# Patient Record
Sex: MALE | Race: ASIAN | NOT HISPANIC OR LATINO | Employment: FULL TIME | URBAN - METROPOLITAN AREA
[De-identification: names, ages, dates, MRNs, and addresses within clinical notes are randomized per-mention and may not be internally consistent; named-entity substitution may affect disease eponyms.]

---

## 2022-09-15 ENCOUNTER — OFFICE VISIT (OUTPATIENT)
Dept: FAMILY MEDICINE | Facility: CLINIC | Age: 37
End: 2022-09-15
Payer: COMMERCIAL

## 2022-09-15 VITALS
RESPIRATION RATE: 16 BRPM | DIASTOLIC BLOOD PRESSURE: 76 MMHG | WEIGHT: 160.8 LBS | HEART RATE: 77 BPM | SYSTOLIC BLOOD PRESSURE: 127 MMHG | TEMPERATURE: 98.1 F | OXYGEN SATURATION: 98 %

## 2022-09-15 DIAGNOSIS — S39.012A STRAIN OF LUMBAR REGION, INITIAL ENCOUNTER: Primary | ICD-10-CM

## 2022-09-15 PROCEDURE — 99203 OFFICE O/P NEW LOW 30 MIN: CPT | Performed by: FAMILY MEDICINE

## 2022-09-15 RX ORDER — NAPROXEN 500 MG/1
500 TABLET ORAL 2 TIMES DAILY WITH MEALS
Qty: 60 TABLET | Refills: 4 | Status: SHIPPED | OUTPATIENT
Start: 2022-09-15

## 2022-09-16 NOTE — PATIENT INSTRUCTIONS
Ice your back several times a day.    Naproxen up to two times daily as needed for pain.  May also use tylenol if needed.    Back Exercises  Back exercises help treat and prevent back injuries. The goal is to increase your strength in your belly (abdominal) and back muscles. These exercises can also help with flexibility. Start these exercises when told by your doctor.  HOME CARE  Back exercises include:  Pelvic Tilt.  Lie on your back with your knees bent. Tilt your pelvis until the lower part of your back is against the floor. Hold this position 5 to 10 sec. Repeat this exercise 5 to 10 times.  Knee to Chest.  Pull 1 knee up against your chest and hold for 20 to 30 seconds. Repeat this with the other knee. This may be done with the other leg straight or bent, whichever feels better. Then, pull both knees up against your chest.  Sit-Ups or Curl-Ups.  Bend your knees 90 degrees. Start with tilting your pelvis, and do a partial, slow sit-up. Only lift your upper half 30 to 45 degrees off the floor. Take at least 2 to 3 seonds for each sit-up. Do not do sit-ups with your knees out straight. If partial sit-ups are difficult, simply do the above but with only tightening your belly (abdominal) muscles and holding it as told.  Hip-Lift.  Lie on your back with your knees flexed 90 degrees. Push down with your feet and shoulders as you raise your hips 2 inches off the floor. Hold for 10 seconds, repeat 5 to 10 times.  Back Arches.  Lie on your stomach. Prop yourself up on bent elbows. Slowly press on your hands, causing an arch in your low back. Repeat 3 to 5 times.  Shoulder-Lifts.  Lie face down with arms beside your body. Keep hips and belly pressed to floor as you slowly lift your head and shoulders off the floor.  Do not overdo your exercises. Be careful in the beginning. Exercises may cause you some mild back discomfort. If the pain lasts for more than 15 minutes, stop the exercises until you see your doctor.  Improvement with exercise for back problems is slow.   Document Released: 01/20/2012 Document Revised: 03/11/2013 Document Reviewed: 10/18/2012  [facility]  Patient Information  2013 [employer] (Ins[employer]), [employer]         [employer]

## 2022-09-16 NOTE — PROGRESS NOTES
OUTPATIENT VISIT NOTE                                                   Date of Visit: 9/15/2022     Chief Complaint   Patient presents with:  Back Problem: Low back pain for 3 days, had this pain before but this time pain doesn't go away and sharp on the right side            History of Present Illness   Joann Burgess is a 37 year old male c/o right side low back pain for three days.  Does lifting at work.  No numbness or tingling of legs.  No weakness of legs.  No fevers.  Used some tylenol two months ago.       MEDICATIONS   Current Outpatient Medications   Medication     naproxen (NAPROSYN) 500 MG tablet     No current facility-administered medications for this visit.         SOCIAL HISTORY   Social History     Tobacco Use     Smoking status: Not on file     Smokeless tobacco: Not on file   Substance Use Topics     Alcohol use: Not on file           Physical Exam   Vitals:    09/15/22 1900   BP: 127/76   Pulse: 77   Resp: 16   Temp: 98.1  F (36.7  C)   TempSrc: Oral   SpO2: 98%   Weight: 72.9 kg (160 lb 12.8 oz)        GEN:  NAD  LUNGS:  Clear to auscultation without wheezing.  Normal effort.  HEART:  RRR without murmur, rub or gallop   BACK:  No pain to percussion over LS spine.  No tenderness to palpation over muscles.  NEURO:  DTR's: Patellar  L 2/4  R 2/4                                Achilles  L  2/4  R 2/4                  Motor:  Great Toe Flexion L 5/5  R 5/5                                                  Extension L 5/5  R 5/5                              Ankle Flexion L 5/5  R 5/5                                        Extension L 5/5  R 5/5                              Knee Flexion  L 5/5  R 5/5                                        Extension  L 5/5  R 5/5                              Hip Abduction  L 5/5  R 5/5                                    Adduction   L 5/5  R 5/5                              Hip Flexion L 5/5  R 5/5                                     Extension L 5/5  R 5/5                   Sensation intact to light touch throughout both LE                   Straight leg raising negative BL          Assessment and Plan     Strain of lumbar region, initial encounter  Likely SI joint  Ice several times a day.  Naproxen up to two times a day as needed.  Given back exercises to start.  Referral given to PT if not improving.  - naproxen (NAPROSYN) 500 MG tablet  Dispense: 60 tablet; Refill: 4  - Physical Therapy Referral                   Discussed signs / symptoms that warrant urgent / emergent medical attention.     Recheck if worsening or not improving.       Tova Chisholm MD          Pertinent History     The following portions of the patient's history were reviewed and updated as appropriate: allergies, current medications, past family history, past medical history, past social history, past surgical history and problem list.

## 2023-05-02 ENCOUNTER — OFFICE VISIT (OUTPATIENT)
Dept: FAMILY MEDICINE | Facility: CLINIC | Age: 38
End: 2023-05-02
Payer: COMMERCIAL

## 2023-05-02 VITALS
RESPIRATION RATE: 18 BRPM | BODY MASS INDEX: 25.01 KG/M2 | HEART RATE: 98 BPM | OXYGEN SATURATION: 96 % | TEMPERATURE: 97.3 F | HEIGHT: 68 IN | DIASTOLIC BLOOD PRESSURE: 72 MMHG | WEIGHT: 165 LBS | SYSTOLIC BLOOD PRESSURE: 106 MMHG

## 2023-05-02 DIAGNOSIS — Z11.59 NEED FOR HEPATITIS C SCREENING TEST: ICD-10-CM

## 2023-05-02 DIAGNOSIS — Z13.21 SCREENING FOR ENDOCRINE, NUTRITIONAL, METABOLIC AND IMMUNITY DISORDER: ICD-10-CM

## 2023-05-02 DIAGNOSIS — Z13.29 SCREENING FOR ENDOCRINE, NUTRITIONAL, METABOLIC AND IMMUNITY DISORDER: ICD-10-CM

## 2023-05-02 DIAGNOSIS — Z13.0 SCREENING FOR ENDOCRINE, NUTRITIONAL, METABOLIC AND IMMUNITY DISORDER: ICD-10-CM

## 2023-05-02 DIAGNOSIS — Z00.00 ROUTINE GENERAL MEDICAL EXAMINATION AT A HEALTH CARE FACILITY: Primary | ICD-10-CM

## 2023-05-02 DIAGNOSIS — Z11.59 SCREENING FOR VIRAL DISEASE: ICD-10-CM

## 2023-05-02 DIAGNOSIS — Z13.228 SCREENING FOR ENDOCRINE, NUTRITIONAL, METABOLIC AND IMMUNITY DISORDER: ICD-10-CM

## 2023-05-02 DIAGNOSIS — L98.9 LESION OF SKIN OF NOSE: ICD-10-CM

## 2023-05-02 DIAGNOSIS — Z11.4 SCREENING FOR HIV (HUMAN IMMUNODEFICIENCY VIRUS): ICD-10-CM

## 2023-05-02 DIAGNOSIS — R06.83 SNORING: ICD-10-CM

## 2023-05-02 LAB
ALBUMIN SERPL BCG-MCNC: 4.8 G/DL (ref 3.5–5.2)
ALP SERPL-CCNC: 105 U/L (ref 40–129)
ALT SERPL W P-5'-P-CCNC: 25 U/L (ref 10–50)
ANION GAP SERPL CALCULATED.3IONS-SCNC: 11 MMOL/L (ref 7–15)
AST SERPL W P-5'-P-CCNC: 32 U/L (ref 10–50)
BILIRUB SERPL-MCNC: 0.7 MG/DL
BUN SERPL-MCNC: 11.1 MG/DL (ref 6–20)
CALCIUM SERPL-MCNC: 9.9 MG/DL (ref 8.6–10)
CHLORIDE SERPL-SCNC: 103 MMOL/L (ref 98–107)
CHOLEST SERPL-MCNC: 182 MG/DL
CREAT SERPL-MCNC: 0.86 MG/DL (ref 0.67–1.17)
DEPRECATED HCO3 PLAS-SCNC: 27 MMOL/L (ref 22–29)
GFR SERPL CREATININE-BSD FRML MDRD: >90 ML/MIN/1.73M2
GLUCOSE SERPL-MCNC: 123 MG/DL (ref 70–99)
HBA1C MFR BLD: 6.4 % (ref 0–5.6)
HDLC SERPL-MCNC: 38 MG/DL
LDLC SERPL CALC-MCNC: 82 MG/DL
NONHDLC SERPL-MCNC: 144 MG/DL
POTASSIUM SERPL-SCNC: 4 MMOL/L (ref 3.4–5.3)
PROT SERPL-MCNC: 7.7 G/DL (ref 6.4–8.3)
SODIUM SERPL-SCNC: 141 MMOL/L (ref 136–145)
TRIGL SERPL-MCNC: 312 MG/DL
TSH SERPL DL<=0.005 MIU/L-ACNC: 2.65 UIU/ML (ref 0.3–4.2)

## 2023-05-02 PROCEDURE — 86706 HEP B SURFACE ANTIBODY: CPT | Performed by: FAMILY MEDICINE

## 2023-05-02 PROCEDURE — 84443 ASSAY THYROID STIM HORMONE: CPT | Performed by: FAMILY MEDICINE

## 2023-05-02 PROCEDURE — 80053 COMPREHEN METABOLIC PANEL: CPT | Performed by: FAMILY MEDICINE

## 2023-05-02 PROCEDURE — 91312 COVID-19 BIVALENT 12+ (PFIZER): CPT | Performed by: FAMILY MEDICINE

## 2023-05-02 PROCEDURE — 87389 HIV-1 AG W/HIV-1&-2 AB AG IA: CPT | Performed by: FAMILY MEDICINE

## 2023-05-02 PROCEDURE — 87340 HEPATITIS B SURFACE AG IA: CPT | Performed by: FAMILY MEDICINE

## 2023-05-02 PROCEDURE — 86803 HEPATITIS C AB TEST: CPT | Performed by: FAMILY MEDICINE

## 2023-05-02 PROCEDURE — 36415 COLL VENOUS BLD VENIPUNCTURE: CPT | Performed by: FAMILY MEDICINE

## 2023-05-02 PROCEDURE — 0124A COVID-19 BIVALENT 12+ (PFIZER): CPT | Performed by: FAMILY MEDICINE

## 2023-05-02 PROCEDURE — 80061 LIPID PANEL: CPT | Performed by: FAMILY MEDICINE

## 2023-05-02 PROCEDURE — 90471 IMMUNIZATION ADMIN: CPT | Performed by: FAMILY MEDICINE

## 2023-05-02 PROCEDURE — 83036 HEMOGLOBIN GLYCOSYLATED A1C: CPT | Performed by: FAMILY MEDICINE

## 2023-05-02 PROCEDURE — 99395 PREV VISIT EST AGE 18-39: CPT | Mod: 25 | Performed by: FAMILY MEDICINE

## 2023-05-02 PROCEDURE — 99213 OFFICE O/P EST LOW 20 MIN: CPT | Mod: 25 | Performed by: FAMILY MEDICINE

## 2023-05-02 PROCEDURE — 90715 TDAP VACCINE 7 YRS/> IM: CPT | Performed by: FAMILY MEDICINE

## 2023-05-02 ASSESSMENT — ENCOUNTER SYMPTOMS
DYSURIA: 0
HEARTBURN: 0
DIARRHEA: 0
WEAKNESS: 0
CONSTIPATION: 0
ABDOMINAL PAIN: 0
EYE PAIN: 0
HEMATURIA: 0
NERVOUS/ANXIOUS: 0
JOINT SWELLING: 0
NAUSEA: 0
PARESTHESIAS: 0
DIZZINESS: 0
COUGH: 1
ARTHRALGIAS: 0
MYALGIAS: 0
PALPITATIONS: 0
FREQUENCY: 0
SORE THROAT: 0
CHILLS: 0
HEADACHES: 0
HEMATOCHEZIA: 0
SHORTNESS OF BREATH: 0
FEVER: 0

## 2023-05-02 ASSESSMENT — PATIENT HEALTH QUESTIONNAIRE - PHQ9
SUM OF ALL RESPONSES TO PHQ QUESTIONS 1-9: 6
SUM OF ALL RESPONSES TO PHQ QUESTIONS 1-9: 6

## 2023-05-02 NOTE — PROGRESS NOTES
SUBJECTIVE:   CC: Joann is an 37 year old who presents for preventative health visit.       5/2/2023     4:50 PM   Additional Questions   Roomed by    Accompanied by self     Patient has been advised of split billing requirements and indicates understanding: Yes  Healthy Habits:     Getting at least 3 servings of Calcium per day:  NO    Bi-annual eye exam:  NO    Dental care twice a year:  NO    Sleep apnea or symptoms of sleep apnea:  Excessive snoring and Sleep apnea    Diet:  Regular (no restrictions) and Vegetarian/vegan    Frequency of exercise:  1 day/week    Duration of exercise:  Less than 15 minutes    Taking medications regularly:  Yes    Medication side effects:  Not applicable    PHQ-2 Total Score: 3    Additional concerns today:  Yes    PROBLEMS TO ADD ON...  New patient, he is here for a physical, he is accompanied by his wife, he has a skin lesionat the root of the nose has been there for years, seems to be getting bigger.  Patient also snoring while lying down and when sleeping, does not stop breathing.  Only taking allergy medication available OTC he works for [employer], he is a non-smoker drinks alcohol occasionally, family history is positive for asthma and snoring, dad.  Today's PHQ-2 Score:       5/2/2023     4:38 PM   PHQ-2 ( 1999 [employer])   Q1: Little interest or pleasure in doing things 3   Q2: Feeling down, depressed or hopeless 0   PHQ-2 Score 3   Q1: Little interest or pleasure in doing things Nearly every day   Q2: Feeling down, depressed or hopeless Not at all   PHQ-2 Score 3       Have you ever done Advance Care Planning? (For example, a Health Directive, POLST, or a discussion with a medical provider or your loved ones about your wishes): No, advance care planning information given to patient to review.  Patient declined advance care planning discussion at this time.    Social History     Tobacco Use     Smoking status: Never     Smokeless tobacco: Never   Vaping Use     Vaping status:  Not on file   Substance Use Topics     Alcohol use: Not on file             5/2/2023     4:37 PM   Alcohol Use   Prescreen: >3 drinks/day or >7 drinks/week? No       Last PSA: No results found for: PSA    Reviewed orders with patient. Reviewed health maintenance and updated orders accordingly - Yes  Lab work is in process  Labs reviewed in EPIC  BP Readings from Last 3 Encounters:   05/02/23 106/72   09/15/22 127/76    Wt Readings from Last 3 Encounters:   05/02/23 74.8 kg (165 lb)   09/15/22 72.9 kg (160 lb 12.8 oz)                  There is no problem list on file for this patient.    No past surgical history on file.    Social History     Tobacco Use     Smoking status: Never     Smokeless tobacco: Never   Vaping Use     Vaping status: Not on file   Substance Use Topics     Alcohol use: Not on file     No family history on file.      Current Outpatient Medications   Medication Sig Dispense Refill     naproxen (NAPROSYN) 500 MG tablet Take 1 tablet (500 mg) by mouth 2 times daily (with meals) (Patient not taking: Reported on 5/2/2023) 60 tablet 4     No Known Allergies  Recent Labs   Lab Test 05/02/23  1738   A1C 6.4*   LDL 82   HDL 38*   TRIG 312*   ALT 25   CR 0.86   GFRESTIMATED >90   POTASSIUM 4.0   TSH 2.65        Reviewed and updated as needed this visit by clinical staff   Tobacco  Allergies  Meds              Reviewed and updated as needed this visit by Provider                   No past medical history on file.   No past surgical history on file.  OB History   No obstetric history on file.       Review of Systems   Constitutional: Negative for chills and fever.   HENT: Negative for congestion, ear pain, hearing loss and sore throat.    Eyes: Negative for pain and visual disturbance.   Respiratory: Positive for cough. Negative for shortness of breath.    Cardiovascular: Negative for chest pain, palpitations and peripheral edema.   Gastrointestinal: Negative for abdominal pain, constipation, diarrhea,  "heartburn, hematochezia and nausea.   Genitourinary: Negative for dysuria, frequency, genital sores, hematuria, impotence, penile discharge and urgency.   Musculoskeletal: Negative for arthralgias, joint swelling and myalgias.   Skin: Negative for rash.   Neurological: Negative for dizziness, weakness, headaches and paresthesias.   Psychiatric/Behavioral: Negative for mood changes. The patient is not nervous/anxious.          OBJECTIVE:   /72 (BP Location: Left arm, Patient Position: Sitting, Cuff Size: Adult Regular)   Pulse 98   Temp 97.3  F (36.3  C) (Temporal)   Resp 18   Ht 1.727 m (5' 8\")   Wt 74.8 kg (165 lb)   SpO2 96%   BMI 25.09 kg/m      Physical Exam  GENERAL: healthy, alert and no distress  EYES: Eyes grossly normal to inspection, PERRL and conjunctivae and sclerae normal  HENT: ear canals and TM's normal, nose and mouth without ulcers or lesions  NECK: no adenopathy, no asymmetry, masses, or scars and thyroid normal to palpation  RESP: lungs clear to auscultation - no rales, rhonchi or wheezes  CV: regular rate and rhythm, normal S1 S2, no S3 or S4, no murmur, click or rub, no peripheral edema and peripheral pulses strong  ABDOMEN: soft, nontender, no hepatosplenomegaly, no masses and bowel sounds normal  MS: no gross musculoskeletal defects noted, no edema  SKIN: About 2 to 3 mm in size cystic-like skin lesion and nasal root area.  Otherwise Remainder of skin exam is benign.  NEURO: Normal strength and tone, mentation intact and speech normal  PSYCH: mentation appears normal, affect normal/bright    Diagnostic Test Results:  Labs reviewed in Epic    ASSESSMENT/PLAN:   (Z00.00) Routine general medical examination at a health care facility  (primary encounter diagnosis)  Comment:   Plan:     (Z11.4) Screening for HIV (human immunodeficiency virus)  Comment:   Plan: HIV Antigen Antibody Combo            (Z11.59) Need for hepatitis C screening test  Comment:   Plan: Hepatitis C Screen Reflex " "to HCV RNA Quant and         Genotype            (Z13.29,  Z13.21,  Z13.228,  Z13.0) Screening for endocrine, nutritional, metabolic and immunity disorder  Comment:   Plan: Lipid panel reflex to direct LDL Non-fasting,         **Comprehensive metabolic panel FUTURE 2mo,         **TSH with free T4 reflex FUTURE 2mo,         **Hemoglobin A1c FUTURE 3mo            (Z11.59) Screening for viral disease  Comment:   Plan: Hepatitis B surface antigen, Hepatitis B         Surface Antibody            (R06.83) Snoring  Comment:   Plan: Adult ENT  Referral            (L98.9) Lesion of skin of nose  Comment:   Plan: Adult Dermatology Referral          New patient, here for a physical, also mentionned snoring while lying down and sleeping, differential diagnosis discussed, included enlarged adenoid or primary sleep disorder, will refer to ENT to check for adenoid as a possible cause, consider sleep study.  Skin lesion right nasal root, referred to dermatologist for further care.  General physical exam lab works discussed and ordered.  Check hepatitis B immunization status.    Patient has been advised of split billing requirements and indicates understanding: Yes      COUNSELING:   Reviewed preventive health counseling, as reflected in patient instructions       Regular exercise       Healthy diet/nutrition       Vision screening       Hearing screening      BMI:   Estimated body mass index is 25.09 kg/m  as calculated from the following:    Height as of this encounter: 1.727 m (5' 8\").    Weight as of this encounter: 74.8 kg (165 lb).   Weight management plan: Discussed healthy diet and exercise guidelines      He reports that he has never smoked. He has never used smokeless tobacco.            Chris Prabhakar MD  [facility]  Answers for HPI/ROS submitted by the patient on 5/2/2023  PHQ9 TOTAL SCORE: 6      "

## 2023-05-03 LAB
HBV SURFACE AB SERPL IA-ACNC: 580.3 M[IU]/ML
HBV SURFACE AB SERPL IA-ACNC: REACTIVE M[IU]/ML
HBV SURFACE AG SERPL QL IA: NONREACTIVE
HCV AB SERPL QL IA: NONREACTIVE
HIV 1+2 AB+HIV1 P24 AG SERPL QL IA: NONREACTIVE

## 2023-05-09 ENCOUNTER — TELEPHONE (OUTPATIENT)
Dept: FAMILY MEDICINE | Facility: CLINIC | Age: 38
End: 2023-05-09
Payer: COMMERCIAL

## 2023-05-09 NOTE — TELEPHONE ENCOUNTER
Called pt and left a message to call clinic back.     - if pt calls back, please relay Dr. Prabhakar's message.    Anthony Granados, KEKEN MARIALUISA  [facility]      ----- Message from Yanni Quick sent at 5/8/2023 10:52 AM CDT -----    ----- Message -----  From: Chris Prabhakar MD  Sent: 5/5/2023  10:03 AM CDT  To: Vanna Aguilar Care Team Pool    Blood sugar is running in the prediabetes range, he should work on healthy lifestyle changes, regular physical activities, recheck in about 4 months.  Cholesterol was also mildly elevated.

## 2023-05-10 NOTE — TELEPHONE ENCOUNTER
Contacted patient and relayed message below from Dr Prabhakar.  Appointment scheduled on 9/10/23 with Dr Prabhakar.  No further action needed.    Karina Potter RN  [facility]

## 2023-05-21 ENCOUNTER — HEALTH MAINTENANCE LETTER (OUTPATIENT)
Age: 38
End: 2023-05-21

## 2023-06-14 ENCOUNTER — OFFICE VISIT (OUTPATIENT)
Dept: FAMILY MEDICINE | Facility: CLINIC | Age: 38
End: 2023-06-14
Payer: COMMERCIAL

## 2023-06-14 VITALS
RESPIRATION RATE: 16 BRPM | DIASTOLIC BLOOD PRESSURE: 70 MMHG | SYSTOLIC BLOOD PRESSURE: 112 MMHG | TEMPERATURE: 98.2 F | HEART RATE: 78 BPM | OXYGEN SATURATION: 100 % | WEIGHT: 170 LBS

## 2023-06-14 DIAGNOSIS — M54.42 ACUTE LEFT-SIDED LOW BACK PAIN WITH LEFT-SIDED SCIATICA: Primary | ICD-10-CM

## 2023-06-14 PROCEDURE — 99203 OFFICE O/P NEW LOW 30 MIN: CPT | Performed by: MASSAGE THERAPIST

## 2023-06-14 NOTE — PROGRESS NOTES
Assessment & Plan     Acute left-sided low back pain with left-sided sciatica  History and exam most consistent with herniated disc.  Has only been having symptoms for a few days.  However, did have a similar presentation of symptoms that lasted about a month about 6 months ago.  The pain eventually went away without intervention.  Discussed keeping active as much as possible.  Given this is his second presentation in the last 6 months do feel a course of physical therapy may be helpful.  He has naproxen at home which he can continue to take for pain management.  If he is not having significant improvement in pain in the next week or 2 or his pain starts to interfere with his sleep could return and consider adding muscle relaxant.  - Physical Therapy Referral; Future      No follow-ups on file.    Geoff Tovar MD  [facility] NICA David is a 37 year old, presenting for the following health issues:  Back Pain (Lower back pain x 3 days )         View : No data to display.              HPI     HPI:   Back pain:   Location:  Low back, both sides seems to switch back and forth  Duration:  Sunday, gradual onset over the course of the day, no trauma or injury   Pain seems to be better in AM, is worse with prolonged sitting (car)  Radiation:  to post thigh on both sides  Numbness/ Tingling? no   Weakness? no   Flexion or Extension bias: Worse with flexion  Red flags?  no fevers/ chills, no saddle paraesthesias, bowel or bladder incontinence  Meds tried? Took some naproxen, not much help. Icy hot. Chiropractor on Sunday.  Bought a back brace today, which seems to be helping a little  Imaging?  Not indicated at this time    Did have an episode of similar pain about 6 months ago, went away after a month, no specific intervention at that time          Objective    /70 (BP Location: Right arm, Patient Position: Sitting, Cuff Size: Adult Large)   Pulse 78   Temp 98.2  F (36.8  C)  (Tympanic)   Resp 16   Wt 77.1 kg (170 lb)   SpO2 100%   There is no height or weight on file to calculate BMI.  Physical Exam     GENERAL: healthy, alert and no distress  RESP: speaking in full sentences, normal work of breathing   CV: extremities well perfused  PSYCH: mentation appears normal, affect normal/bright  BACK:   ROM: Pain with flexion   Bony tenderness: None   Paraspinal tenderness: None.   Sensation: intact in b/l lower extremities.   Maneuvers: Positive straight leg raise b/l.   Neuro: DTR's 2+.

## 2023-06-16 ENCOUNTER — THERAPY VISIT (OUTPATIENT)
Dept: PHYSICAL THERAPY | Facility: REHABILITATION | Age: 38
End: 2023-06-16
Attending: MASSAGE THERAPIST
Payer: COMMERCIAL

## 2023-06-16 DIAGNOSIS — M54.42 ACUTE LEFT-SIDED LOW BACK PAIN WITH LEFT-SIDED SCIATICA: ICD-10-CM

## 2023-06-16 DIAGNOSIS — M62.81 WEAKNESS OF TRUNK MUSCULATURE: ICD-10-CM

## 2023-06-16 PROCEDURE — 97110 THERAPEUTIC EXERCISES: CPT | Mod: GP | Performed by: PHYSICAL THERAPIST

## 2023-06-16 PROCEDURE — 97161 PT EVAL LOW COMPLEX 20 MIN: CPT | Mod: GP | Performed by: PHYSICAL THERAPIST

## 2023-06-16 NOTE — PROGRESS NOTES
PHYSICAL THERAPY EVALUATION  Type of Visit: Evaluation    See electronic medical record for Abuse and Falls Screening details.    Subjective       Pt is a 37 year-old man with chief complaint acute low back/right buttock pain. No numbness/tingling in the legs. No injury. No imaging has been done. Bending forward is difficult. No weakness noted. He has been working as a , but it has been difficult - has to lift up to 70 lbs typically, but hasn t been lifting much the past week. Since it began, it is slowly getting better.   Presenting condition or subjective complaint: Herniated disc  Date of onset: 24    Relevant medical history:     Dates & types of surgery: None    Prior diagnostic imaging/testing results:       Prior therapy history for the same diagnosis, illness or injury: No      Prior Level of Function   Transfers: Independent  Ambulation: Independent  ADL: Independent      Living Environment  Social support: With a significant other or spouse   Type of home: Apartment/condo   Stairs to enter the home: Yes 3 Is there a railing: Yes   Ramp:     Stairs inside the home: No       Help at home: Self Cares (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, family, etc); Home and Yard maintenance tasks  Equipment owned:       Employment: Yes   Hobbies/Interests:      Patient goals for therapy: Bend, walk    Pain assessment: Pain present  Location: R low back and buttock/Ratin/10     Objective   LUMBAR SPINE EVALUATION  INTEGUMENTARY (edema, incisions): WNL  POSTURE: Decreased lumbar lordosis, left lateral shift  GAIT:   Weightbearing Status: WBAT  Assistive Device(s): None  Gait Deviations: slow pace, wider JOSE, decreased trunk rotation    ROM: lumbar:  flx limited by 75% with pain into the tailbone, left lateral shift during flx noted  Ext limited by 75% with pain into the tailbone (same amount of pain as with flx)  Right/L side bending WNL, no pain   Repeated lumbar flx: centralized,  less pain over time  Repeated lumbar ext: slight pain in tailbone   Prone on elbows with increased pain    PELVIC/SI SCREEN: sacral thrust negative  STRENGTH: TA: 3/5, trunk extensors: 3/5    MYOTOMES:    Left Right   T12-L3 (Hip Flexion) 5 4, + (mild)   L2-4 (Quads)  3+, + (mild) 4+, + (mild)   L4 (Ankle DF) 5 5   L5 (Great Toe Ext) 5 5   S1 (Toe Raise) DNT DNT   B hip adduction: 5/5     LUMBAR/HIP Special Tests:    Left Right   SCAR Negative  Negative    FADIR/Labrum/DENISA Negative  Negative    SLR Positive Positive   Slump Positive Positive        PALPATION: non-tender lumbar paraspinals and buttocks  SPINAL SEGMENTAL CONCLUSIONS: WNL lumbar spring testing, but painful at L4-5      Assessment & Plan   CLINICAL IMPRESSIONS   Medical Diagnosis: Acute left-sided low back pain with left-sided sciatica    Treatment Diagnosis: Weakness of trunk musculature   Impression/Assessment: Pt is a 37 year-old man with chief complaint acute low back/right buttock pain. He demonstrates symptoms most consistent with central disc herniation with positive B slump and SLR and centralized symptoms with repeated lumbar flexion at this time. He demonstrates decreased core strength, decreased lumbar ROM and difficulty with transitional movements and work tasks (lifting up to 70 lbs). Pain has not reached past the buttock yet and there are no myotomes or dermatomes affected in the LEs, though neural tension is positive bilaterally. He is appropriate for skilled PT to address impairments, instruct in HEP to reduce pain and return to prior level of function.     Clinical Decision Making (Complexity):   Clinical Presentation: Stable/Uncomplicated  Clinical Presentation Rationale: based on medical and personal factors listed in PT evaluation  Clinical Decision Making (Complexity): Low complexity    PLAN OF CARE  Treatment Interventions:  Modalities: Mechanical Traction  Interventions: Gait Training, Manual Therapy, Neuromuscular Re-education,  Therapeutic Activity, Therapeutic Exercise, Self-Care/Home Management    Long Term Goals     PT Goal 1  Goal Identifier: Work  Goal Description: Pt will report ability to tolerate all work tasks, including driving and lifting up to 70 lbs without pain to return to prior level of function.  Target Date: 08/25/23  PT Goal 2  Goal Identifier: Pain  Goal Description: Pt will report reduce pain from 5/10 (low back/buttock) to 0/10 to return to prior level of function.  Target Date: 08/25/23      Frequency of Treatment: 1x/week x 3 weeks, then return in 1 month  Duration of Treatment: 5-10      Risks and benefits of evaluation/treatment have been explained.   Patient/Family/caregiver agrees with Plan of Care.     Evaluation Time:     PT Eval, Low Complexity Minutes (54297): 18      Signing Clinician: Nemesio Richardson PT

## 2023-06-22 ENCOUNTER — THERAPY VISIT (OUTPATIENT)
Dept: PHYSICAL THERAPY | Facility: REHABILITATION | Age: 38
End: 2023-06-22
Attending: MASSAGE THERAPIST
Payer: COMMERCIAL

## 2023-06-22 DIAGNOSIS — M62.81 GENERALIZED MUSCLE WEAKNESS: ICD-10-CM

## 2023-06-22 DIAGNOSIS — M62.81 WEAKNESS OF TRUNK MUSCULATURE: Primary | ICD-10-CM

## 2023-06-22 DIAGNOSIS — M54.42 ACUTE LEFT-SIDED LOW BACK PAIN WITH LEFT-SIDED SCIATICA: ICD-10-CM

## 2023-06-22 PROCEDURE — 97110 THERAPEUTIC EXERCISES: CPT | Mod: CQ | Performed by: PHYSICAL THERAPY ASSISTANT

## 2023-08-24 ENCOUNTER — THERAPY VISIT (OUTPATIENT)
Dept: PHYSICAL THERAPY | Facility: REHABILITATION | Age: 38
End: 2023-08-24
Payer: COMMERCIAL

## 2023-08-24 DIAGNOSIS — M62.81 WEAKNESS OF TRUNK MUSCULATURE: Primary | ICD-10-CM

## 2023-08-24 DIAGNOSIS — M62.81 GENERALIZED MUSCLE WEAKNESS: ICD-10-CM

## 2023-08-24 PROCEDURE — 97112 NEUROMUSCULAR REEDUCATION: CPT | Mod: GP | Performed by: PHYSICAL THERAPY ASSISTANT

## 2023-08-24 PROCEDURE — 97110 THERAPEUTIC EXERCISES: CPT | Mod: GP | Performed by: PHYSICAL THERAPY ASSISTANT

## 2023-09-21 ENCOUNTER — OFFICE VISIT (OUTPATIENT)
Dept: FAMILY MEDICINE | Facility: CLINIC | Age: 38
End: 2023-09-21
Payer: COMMERCIAL

## 2023-09-21 VITALS
HEIGHT: 68 IN | HEART RATE: 103 BPM | BODY MASS INDEX: 26.22 KG/M2 | RESPIRATION RATE: 16 BRPM | WEIGHT: 173 LBS | OXYGEN SATURATION: 98 % | TEMPERATURE: 98 F | DIASTOLIC BLOOD PRESSURE: 69 MMHG | SYSTOLIC BLOOD PRESSURE: 101 MMHG

## 2023-09-21 DIAGNOSIS — R73.03 PREDIABETES: ICD-10-CM

## 2023-09-21 DIAGNOSIS — E78.1 HYPERTRIGLYCERIDEMIA: ICD-10-CM

## 2023-09-21 DIAGNOSIS — Z13.9 ENCOUNTER FOR SCREENING INVOLVING SOCIAL DETERMINANTS OF HEALTH (SDOH): ICD-10-CM

## 2023-09-21 DIAGNOSIS — R73.03 PREDIABETES: Primary | ICD-10-CM

## 2023-09-21 DIAGNOSIS — E11.9 TYPE 2 DIABETES MELLITUS WITHOUT COMPLICATION, WITHOUT LONG-TERM CURRENT USE OF INSULIN (H): Primary | ICD-10-CM

## 2023-09-21 LAB
ALBUMIN SERPL BCG-MCNC: 4.3 G/DL (ref 3.5–5.2)
ALP SERPL-CCNC: 102 U/L (ref 40–129)
ALT SERPL W P-5'-P-CCNC: 27 U/L (ref 0–70)
ANION GAP SERPL CALCULATED.3IONS-SCNC: 11 MMOL/L (ref 7–15)
AST SERPL W P-5'-P-CCNC: 36 U/L (ref 0–45)
BILIRUB SERPL-MCNC: 1 MG/DL
BUN SERPL-MCNC: 9.3 MG/DL (ref 6–20)
CALCIUM SERPL-MCNC: 9.1 MG/DL (ref 8.6–10)
CHLORIDE SERPL-SCNC: 104 MMOL/L (ref 98–107)
CHOLEST SERPL-MCNC: 171 MG/DL
CREAT SERPL-MCNC: 0.82 MG/DL (ref 0.67–1.17)
CREAT UR-MCNC: 167 MG/DL
DEPRECATED HCO3 PLAS-SCNC: 26 MMOL/L (ref 22–29)
EGFRCR SERPLBLD CKD-EPI 2021: >90 ML/MIN/1.73M2
GLUCOSE SERPL-MCNC: 181 MG/DL (ref 70–99)
HBA1C MFR BLD: 6.8 % (ref 0–5.6)
HDLC SERPL-MCNC: 38 MG/DL
LDLC SERPL CALC-MCNC: 84 MG/DL
MICROALBUMIN UR-MCNC: <12 MG/L
MICROALBUMIN/CREAT UR: NORMAL MG/G{CREAT}
NONHDLC SERPL-MCNC: 133 MG/DL
POTASSIUM SERPL-SCNC: 4.4 MMOL/L (ref 3.4–5.3)
PROT SERPL-MCNC: 7.5 G/DL (ref 6.4–8.3)
SODIUM SERPL-SCNC: 141 MMOL/L (ref 136–145)
TRIGL SERPL-MCNC: 246 MG/DL

## 2023-09-21 PROCEDURE — 80053 COMPREHEN METABOLIC PANEL: CPT | Performed by: FAMILY MEDICINE

## 2023-09-21 PROCEDURE — 99214 OFFICE O/P EST MOD 30 MIN: CPT | Performed by: FAMILY MEDICINE

## 2023-09-21 PROCEDURE — 83036 HEMOGLOBIN GLYCOSYLATED A1C: CPT | Performed by: FAMILY MEDICINE

## 2023-09-21 PROCEDURE — 36415 COLL VENOUS BLD VENIPUNCTURE: CPT | Performed by: FAMILY MEDICINE

## 2023-09-21 PROCEDURE — 82570 ASSAY OF URINE CREATININE: CPT | Performed by: FAMILY MEDICINE

## 2023-09-21 PROCEDURE — 80061 LIPID PANEL: CPT | Performed by: FAMILY MEDICINE

## 2023-09-21 PROCEDURE — 82043 UR ALBUMIN QUANTITATIVE: CPT | Performed by: FAMILY MEDICINE

## 2023-09-21 RX ORDER — LANCETS
EACH MISCELLANEOUS
Qty: 100 EACH | Refills: 6 | Status: SHIPPED | OUTPATIENT
Start: 2023-09-21 | End: 2023-12-08

## 2023-09-21 NOTE — COMMUNITY RESOURCES LIST (ENGLISH)
09/21/2023   [facility] [employer]  N/A  For questions about this resource list or additional care needs, please contact your primary care clinic or care manager.  Phone: [phone]   Email: N/A   Address: [address] [place] [id number]   Hours: N/A        Financial Stability       Utility payment assistance  1  [facility] [place] Distance: 1.73 miles      Phone/Virtual   [address] [place] [address]  Language: English  Hours: Mon - Fri 9:00 AM - 4:00 PM  Fees: Free   Phone: (490) 609-6848 Email: [email or web address] Website: [email or web address]     2  [employer]) [facility] - Supportive Housing Assistance Program (SHAP) Distance: 2.01 miles      Phone/Virtual   [address] [place] [address]  Language: English, Hmong, [place], [place]ian  Hours: Mon - Fri 8:30 AM - 5:00 PM  Fees: Free   Phone: (429) 229-1072 Email: [email or web address]@[email or web address] Website: http://www.hmong.org/hap-impact-areas/          Important Numbers & Websites       Emergency Services   [address]  [facility]   311  Poison Control   (987) 439-7714  Suicide Prevention Lifeline   (715) 282-6448 (TALK)  Child Abuse Hotline   (457) 830-6148 (4-A-Child)  Sexual Assault Hotline   (520) 578-8420 (HOPE)  National Runaway Safeline   (808) 493-8289 (RUNAWAY)  All-Options Talkline   (720) 151-7774  Substance Abuse Referral   (752) 750-4676 (HELP)

## 2023-09-21 NOTE — PROGRESS NOTES
Assessment & Plan     Type 2 diabetes mellitus without complication, without long-term current use of insulin (H)  Newly diagnosed diabetes type 2, discussed healthy lifestyle changes, regular physical activities, no medication at this time, but tight control of blood sugar, recheck in 3 months, decline DM medication refill at this time.  - blood glucose monitoring (NO BRAND SPECIFIED) meter device kit; Use to test blood sugar 2 times daily or as directed. Preferred blood glucose meter OR supplies to accompany: Blood Glucose Monitor Brands: per insurance.  - thin (NO BRAND SPECIFIED) lancets; Use with lanceting device. To accompany: Blood Glucose Monitor Brands: per insurance.  - blood glucose (NO BRAND SPECIFIED) test strip; Use to test blood sugar 2 times daily or as directed. To accompany: Blood Glucose Monitor Brands: per insurance.    Encounter for screening involving social determinants of health (SDoH)    - Primary Care - Care Coordination Referral; Future    Prediabetes    - Comprehensive metabolic panel  - Hemoglobin A1c  - Albumin Random Urine Quantitative with Creat Ratio    Hypertriglyceridemia  Discussed healthy lifestyle changes  Consider low intensity therapy.  - Lipid panel reflex to direct LDL Fasting    Review of external notes as documented elsewhere in note  30 minutes spent by me on the date of the encounter doing chart review, review of outside records, review of test results, interpretation of tests, patient visit, and documentation        Work on weight loss  Regular exercise    MD KWADWO [facility]   Joann is a 38 year old, presenting for the following health issues:  Follow Up and Consult For (Hernia and allergy. Would like to have referral for allergy testing.)      9/21/2023    10:41 AM   Additional Questions   Roomed by Meagan BURKETT   Accompanied by self       History of Present Illness       Back Pain:  He presents for follow up of back  pain. Patient's back pain is a new problem.    Original cause of back pain: not sure  First noticed back pain: more than 1 month ago  Patient feels back pain: constantlyLocation of back pain:  Left lower back  Description of back pain: shooting  Back pain spreads: nowhere and left buttocks    Since patient first noticed back pain, pain is: gradually improving  Does back pain interfere with his job:  Not applicable  On a scale of 1-10 (10 being the worst), patient describes pain as:  6  What makes back pain worse: certain positions   Acupuncture: not tried  Acetaminophen: not tried  Activity or exercise: helpful  Chiropractor:  Not helpful  Cold: not tried  Heat: not tried  Massage: helpful  Muscle relaxants: helpful  NSAIDS: helpful  Opioids: not tried  Physical Therapy: helpful  Rest: helpful  Steroid Injection: not tried  Stretching: helpful  Surgery: not tried  TENS unit: not tried  Topical pain relievers: not tried  Other healthcare providers patient is seeing for back pain: Physical Therapist    Diabetes:   He presents for follow up of diabetes.    He is not checking blood glucose.         He has no concerns regarding his diabetes at this time.   He is not experiencing numbness or burning in feet, excessive thirst, blurry vision, weight changes or redness, sores or blisters on feet.           He eats 2-3 servings of fruits and vegetables daily.He consumes 1 sweetened beverage(s) daily.He exercises with enough effort to increase his heart rate 10 to 19 minutes per day.  He exercises with enough effort to increase his heart rate 3 or less days per week.   He is taking medications regularly.       3 months ago A1c was 6.4%, discussion was made to work on healthy lifestyle changes, he is here today for follow-up, unfortunately he did have back pain that limited his physical activities, A1c is coming at 6.8% today meeting the criteria for diagnosis of diabetes type 2.  Denies any excessive urination or thirst.  He  "is not aware of any diabetes in the family.  He does mention possible allergy to chili, pineapple with  sometimes itchy around his lips area.    Review of Systems   Constitutional, HEENT, cardiovascular, pulmonary, gi and gu systems are negative, except as otherwise noted.      Objective    /69 (BP Location: Left arm, Patient Position: Sitting, Cuff Size: Adult Large)   Pulse 103   Temp 98  F (36.7  C) (Temporal)   Resp 16   Ht 1.736 m (5' 8.35\")   Wt 78.5 kg (173 lb)   SpO2 98%   BMI 26.04 kg/m    Body mass index is 26.04 kg/m .  Physical Exam   GENERAL: healthy, alert and no distress  NECK: no adenopathy, no asymmetry, masses, or scars and thyroid normal to palpation  RESP: lungs clear to auscultation - no rales, rhonchi or wheezes  CV: regular rate and rhythm, normal S1 S2, no S3 or S4, no murmur, click or rub, no peripheral edema and peripheral pulses strong  ABDOMEN: soft, nontender, no hepatosplenomegaly, no masses and bowel sounds normal  MS: no gross musculoskeletal defects noted, no edema  Diabetic foot exam: normal DP and PT pulses, no trophic changes or ulcerative lesions, and normal sensory exam    Results for orders placed or performed in visit on 09/21/23   Comprehensive metabolic panel     Status: Abnormal   Result Value Ref Range    Sodium 141 136 - 145 mmol/L    Potassium 4.4 3.4 - 5.3 mmol/L    Chloride 104 98 - 107 mmol/L    Carbon Dioxide (CO2) 26 22 - 29 mmol/L    Anion Gap 11 7 - 15 mmol/L    Urea Nitrogen 9.3 6.0 - 20.0 mg/dL    Creatinine 0.82 0.67 - 1.17 mg/dL    Calcium 9.1 8.6 - 10.0 mg/dL    Glucose 181 (H) 70 - 99 mg/dL    Alkaline Phosphatase 102 40 - 129 U/L    AST 36 0 - 45 U/L    ALT 27 0 - 70 U/L    Protein Total 7.5 6.4 - 8.3 g/dL    Albumin 4.3 3.5 - 5.2 g/dL    Bilirubin Total 1.0 <=1.2 mg/dL    GFR Estimate >90 >60 mL/min/1.73m2   Hemoglobin A1c     Status: Abnormal   Result Value Ref Range    Hemoglobin A1C 6.8 (H) 0.0 - 5.6 %   Lipid panel reflex to direct LDL " Fasting     Status: Abnormal   Result Value Ref Range    Cholesterol 171 <200 mg/dL    Triglycerides 246 (H) <150 mg/dL    Direct Measure HDL 38 (L) >=40 mg/dL    LDL Cholesterol Calculated 84 <=100 mg/dL    Non HDL Cholesterol 133 (H) <130 mg/dL    Narrative    Cholesterol  Desirable:  <200 mg/dL    Triglycerides  Normal:  Less than 150 mg/dL  Borderline High:  150-199 mg/dL  High:  200-499 mg/dL  Very High:  Greater than or equal to 500 mg/dL    Direct Measure HDL  Female:  Greater than or equal to 50 mg/dL   Male:  Greater than or equal to 40 mg/dL    LDL Cholesterol  Desirable:  <100mg/dL  Above Desirable:  100-129 mg/dL   Borderline High:  130-159 mg/dL   High:  160-189 mg/dL   Very High:  >= 190 mg/dL    Non HDL Cholesterol  Desirable:  130 mg/dL  Above Desirable:  130-159 mg/dL  Borderline High:  160-189 mg/dL  High:  190-219 mg/dL  Very High:  Greater than or equal to 220 mg/dL   Albumin Random Urine Quantitative with Creat Ratio     Status: None   Result Value Ref Range    Creatinine Urine mg/dL 167.0 mg/dL    Albumin Urine mg/L <12.0 mg/L    Albumin Urine mg/g Cr         This note was completed in part using a voice recognition software, any grammatical or context distortion are unintentional and inherent to the software.        Prior to immunization administration, verified patients identity using patient s name and date of birth. Please see Immunization Activity for additional information.     Screening Questionnaire for Adult Immunization  2  Are you sick today?   No   Do you have allergies to medications, food, a vaccine component or latex?   Don't Know   Have you ever had a serious reaction after receiving a vaccination?   No   Do you have a long-term health problem with heart, lung, kidney, or metabolic disease (e.g., diabetes), asthma, a blood disorder, no spleen, complement component deficiency, a cochlear implant, or a spinal fluid leak?  Are you on long-term aspirin therapy?   No   Do you have  cancer, leukemia, HIV/AIDS, or any other immune system problem?   No   Do you have a parent, brother, or sister with an immune system problem?   No   In the past 3 months, have you taken medications that affect  your immune system, such as prednisone, other steroids, or anticancer drugs; drugs for the treatment of rheumatoid arthritis, Crohn s disease, or psoriasis; or have you had radiation treatments?   No   Have you had a seizure, or a brain or other nervous system problem?   No   During the past year, have you received a transfusion of blood or blood    products, or been given immune (gamma) globulin or antiviral drug?   No   For women: Are you pregnant or is there a chance you could become       pregnant during the next month?   No   Have you received any vaccinations in the past 4 weeks?   No     Immunization questionnaire answers were all negative.      Patient instructed to remain in clinic for 15 minutes afterwards, and to report any adverse reactions.     Screening performed by Meagan Valle MA on 9/21/2023 at 10:47 AM.

## 2023-09-21 NOTE — COMMUNITY RESOURCES LIST (ENGLISH)
09/21/2023   [facility] - Outpatient Clinics  N/A  For additional resource needs, please contact your health insurance member services or your primary care team.  Phone: [phone]   Email: N/A   Address: [facility] [place] [address]   Hours: N/A        Financial Stability       Utility payment assistance  1  [place] [facility] - Energy and Utilities Distance: 2.51 miles      In-Person, Phone/Virtual   [address] [place] [address]  Language: English  Hours: Mon - Fri 8:30 AM - 4:30 PM  Fees: Free   Phone: (968) 135-4104 Website: [place]://[place]/[employer]/energy/consumer-assistance/energy-assistance-program/     2  [place] Public Ratio [facility] [place]'s Telephone Assistance Plan (TAP) and [facility] Lifeline and Affordable Connectivity Program (ACP) Distance: 6.91 miles      Phone/Virtual   [address] [place] [address]  Language: English  Fees: Free   Phone: (148) 997-2733 Email: belem.donna@[facility].[place]. Website: https://[place]/puc/consumers/telephone/          Important Numbers & Websites       [facility]   211 211unitedway.org  Poison Control   (238) 169-8477 [place]  Suicide and Crisis Lifeline   [address] [place], [place] [address]line.org  Childhelp [place] Child Abuse Hotline   249.285.5453 Childhelphotline.org  National Sexual Assault Hotline   (680) 458-4254 (HOPE) Rainn.org  National Runaway Safeline   (728) 289-5353 (RUNAWAY) 1800runaway.org  Pregnancy & Postpartum Support [place]   Call/text 305-944-3409 Ppsupport[place].org  Substance Abuse National Helpline ([facility]   468-358-HELP (8544) Findtreatment.gov  Emergency Services   911

## 2023-09-22 ENCOUNTER — PATIENT OUTREACH (OUTPATIENT)
Dept: CARE COORDINATION | Facility: CLINIC | Age: 38
End: 2023-09-22
Payer: COMMERCIAL

## 2023-09-22 PROBLEM — E78.1 HYPERTRIGLYCERIDEMIA: Status: ACTIVE | Noted: 2023-09-22

## 2023-09-22 NOTE — PROGRESS NOTES
Clinic Care Coordination Contact  [facility]/Voicemail      Clinical Data: CHW Outreach    Outreach attempted x 1 regarding CCC enrollment.  Left message on patient's voicemail with call back information and requested return call.    Plan: CHW will attempt another outreach to the patient via phone regarding enrollment into CCC in 1-2 business days.    SAMANTHA Bull  Clinic Care Coordination   [facility]   Phone: [phone]  [email or web address]

## 2023-09-25 ENCOUNTER — PATIENT OUTREACH (OUTPATIENT)
Dept: CARE COORDINATION | Facility: CLINIC | Age: 38
End: 2023-09-25
Payer: COMMERCIAL

## 2023-09-25 NOTE — PROGRESS NOTES
Clinic Care Coordination Contact  Community Health Worker Initial Outreach    Reason: CCC CHW Initial Outreach Called- Referral to Clinic Care Coordination (CCC) Service  Referral provider/name: Chris Prabhakar MD     Potential Patient Outreach Discussion:   Attempted #2: Patient was identified as a potential candidate and referred to Clinic Care Coordination Service. CHW call and spoke with patient today, 9- (Monday) to discuss possible clinic care coordination enrollment. Have introduced myself to patient. Clinic care coordination service was described to the patient and immediate needs were discussed. Patient is aware CCC team includes CHW, SW, RN, and FRW. Patient shared info below. The patient declined enrollment into CCC service at this time. CHW suggested patient to connect with PCP or referral provider office in the future to be re-refer to CCC service if change mind. Patient confirmed and thank today's called.     Patient accepts CC: No, patient has no resources need or concerns at this time per pt.    Plan:   -No further action need from CCC service at this.  -Patient connect with PCP or referral provider office in the future to be re-refer to Clinic Care Coordination Service if change mind.   -Message routed to updates referral provider.

## 2023-09-27 ENCOUNTER — THERAPY VISIT (OUTPATIENT)
Dept: PHYSICAL THERAPY | Facility: REHABILITATION | Age: 38
End: 2023-09-27
Payer: COMMERCIAL

## 2023-09-27 DIAGNOSIS — M62.81 WEAKNESS OF TRUNK MUSCULATURE: Primary | ICD-10-CM

## 2023-09-27 PROCEDURE — 97110 THERAPEUTIC EXERCISES: CPT | Mod: GP | Performed by: PHYSICAL THERAPIST

## 2023-09-27 NOTE — PROGRESS NOTES
DISCHARGE  Reason for Discharge: Pt reporting 75% improvement, HEP in place and OK to continue on his own.     Equipment Issued: L4 blue band    Discharge Plan: Patient to continue home program. May also follow up with MD for a spine referral for other options.     Referring Provider:  Geoff Tovar       09/27/23 0500   Appointment Info   Signing clinician's name / credentials Nemesio Richardson, PT   Total/Authorized Visits 5-10   Visits Used 4   Medical Diagnosis Acute left-sided low back pain with left-sided sciatica   PT Tx Diagnosis Weakness of trunk musculature   Progress Note/Certification   Onset of illness/injury or Date of Surgery 06/11/23   Therapy Frequency 1x/week x 3 weeks, then return in 1 month   Predicted Duration 5-10   Progress Note Due Date   (visit 10)   Supervision   PT Assistant Visit Number 2   GOALS   PT Goals 2   PT Goal 1   Goal Identifier Work   Goal Description Pt will report ability to tolerate all work tasks, including driving and lifting up to 70 lbs without pain to return to prior level of function.   Goal Progress Progressing, pt has some pain with driving - after 20 minutes of time. Trying to avoid lifting, max of 30# right now.   Target Date 08/25/23   PT Goal 2   Goal Identifier Pain   Goal Description Pt will report reduce pain from 5/10 (low back/buttock) to 0/10 to return to prior level of function.   Goal Progress Goal not met, pain is constant. At best if was 2/10, but now it is 4-5/10   Target Date 08/25/23   Subjective Report   Subjective Report Pt reports he is about 75% improved overall. His MRI in [facility] indicated a disc bulge and traction helped. Today pain is 4/10. Feeling pain still with bending forward.   Objective Measure 1   Objective Measure Lumbar ROM   Details flx limited by 25% with left low back pain, ext WNL, R and L side bending WNL   Objective Measure 2   Objective Measure slump:   Details positive B   Objective Measure 3   Objective Measure SLR   Details  positive on R   Treatment Interventions (PT)   Interventions Therapeutic Procedure/Exercise   Therapeutic Procedure/Exercise   Therapeutic Procedures: strength, endurance, ROM, flexibillity minutes (36712) 53   Ther Proc 1 objective and subjective measures taken   Ther Proc 1 - Details Upright bike warm up level 3 x 6 minutes, repeated counter extension x 20. Counter plank, then with alternating hip abd and ext and shoulder taps x 1 minute each. Prone on elbows x 2 minutes, prone press ups, 5 x 2. Pull downs L3, L4 band x 10-15 each with cueing for TA activation. Trunk rotation L4 band x 15 each side, pallof press L4 band x 15 each side. Cat/cow stretches x 15.   Skilled Intervention Pt given verbal, visual and tactile cueing with demonstration to perform correct technique and appropriate alignment for exercises attempted. Provided rationale for exercises performed and educated on the importance of completing them as instructed.   Patient Response/Progress Muscle fatigue with strengthening - no significant pain and did well overall   Plan   Home program PTRx   Updates to plan of care Updated HEP to repeated trunk extension and added core strengthening   Plan for next session N/A   Comments   Comments Pt has been seen for 4 visits from 6/16/23 with report of 75% overall improvement (Oswestry improved from 42% to 28%). He still shows signs of disc irritation with positive SLR and slump. Recommended that he discuss possibility of a spine referral with his MD (he may also benefit from [facility] program in the future). Changed HEP significantly today to focus on lumbar ext as this is more tolerable and able to work on some lumbar flx as well (with cat-cow stretch, e.g.). He is doing well with strengthening exercises and these do not cause pain. His main limitation is sitting for a long time and lifting heavier weights, though both are getting easier. He is appropriate for D/C with significant improvement reported and HEP in  place.   Total Session Time   Timed Code Treatment Minutes 53   Total Treatment Time (sum of timed and untimed services) 53       Nemesio Richardson, PT, DPT, CLT  9/27/2023

## 2023-10-31 ENCOUNTER — OFFICE VISIT (OUTPATIENT)
Dept: DERMATOLOGY | Facility: CLINIC | Age: 38
End: 2023-10-31
Attending: FAMILY MEDICINE
Payer: COMMERCIAL

## 2023-10-31 DIAGNOSIS — L98.9 LESION OF SKIN OF NOSE: ICD-10-CM

## 2023-10-31 DIAGNOSIS — D48.5 NEOPLASM OF UNCERTAIN BEHAVIOR OF SKIN: Primary | ICD-10-CM

## 2023-10-31 PROCEDURE — 99203 OFFICE O/P NEW LOW 30 MIN: CPT | Performed by: STUDENT IN AN ORGANIZED HEALTH CARE EDUCATION/TRAINING PROGRAM

## 2023-10-31 NOTE — PROGRESS NOTES
[facility] Dermatology Note    Encounter Date: Oct 31, 2023    Dermatology Problem List:    ______________________________________    Impression/Plan:  Joann was seen today for derm problem.    Diagnoses and all orders for this visit:    Neoplasm of uncertain behavior of skin  - small 1mm whitish papule c/w milia vs other benign lesion  - discussed doing a biopsy today when pt declines after hearing lesion is likely benign   - follow up if growing or changing     Lesion of skin of nose  -     Adult Dermatology Referral      Follow-up PRN.       Staff Involved:  Staff Only    Kobi Benavidez MD   of Dermatology  Department of Dermatology  [facility]      CC:   Chief Complaint   Patient presents with    Derm Problem     Pt spot of concern on nasal bridge.        History of Present Illness:  Mr. Joann Burgess is a 38 year old male who presents as a new patient.    Patient presents for concerns about a spot on his nose that is been present for years.  It will sometimes get larger, he tied a string around and then it falls off and will come back.  Today there is about a 1 mm papule on the nose.  Its not bleeding or growing rapidly not painful.  He mainly wants to know whether this area is a danger to his head    Labs:      Physical exam:  Vitals: There were no vitals taken for this visit.  GEN: well developed, well-nourished, in no acute distress, in a pleasant mood.     SKIN: Virgen phototype 3  - Focused examination of the face was performed.  - skin colored to whitish papule on the nose 1mm   - No other lesions of concern on areas examined.     Past Medical History:   History reviewed. No pertinent past medical history.  History reviewed. No pertinent surgical history.    Social History:   reports that he has never smoked. He has never used smokeless tobacco. He reports that he does not drink alcohol and does not use drugs.    Family  History:  History reviewed. No pertinent family history.    Medications:  Current Outpatient Medications   Medication Sig Dispense Refill    blood glucose (NO BRAND SPECIFIED) test strip Use to test blood sugar 2 times daily or as directed. To accompany: Blood Glucose Monitor Brands: per insurance. 100 strip 6    blood glucose monitoring (NO BRAND SPECIFIED) meter device kit Use to test blood sugar 2 times daily or as directed. Preferred blood glucose meter OR supplies to accompany: Blood Glucose Monitor Brands: per insurance. 1 kit 0    naproxen (NAPROSYN) 500 MG tablet Take 1 tablet (500 mg) by mouth 2 times daily (with meals) 60 tablet 4    thin (NO BRAND SPECIFIED) lancets Use with lanceting device. To accompany: Blood Glucose Monitor Brands: per insurance. 100 each 6     No Known Allergies

## 2023-10-31 NOTE — LETTER
10/31/2023       RE: Joann Burgess  [address]  [place] [address]     Dear Colleague,    Thank you for referring your patient, Joann Burgess, to the [facility] D[facility]MATOLOG[facility] [place] [facility]. Please see a copy of my visit note below.    [facility] Dermatology Note    Encounter Date: Oct 31, 2023    Dermatology Problem List:    ______________________________________    Impression/Plan:  Joann was seen today for derm problem.    Diagnoses and all orders for this visit:    Neoplasm of uncertain behavior of skin  - small 1mm whitish papule c/w milia vs other benign lesion  - discussed doing a biopsy today when pt declines after hearing lesion is likely benign   - follow up if growing or changing     Lesion of skin of nose  -     Adult Dermatology Referral      Follow-up PRN.       Staff Involved:  Staff Only    Kobi Benavidez MD   of Dermatology  Department of Dermatology  [facility]      CC:   Chief Complaint   Patient presents with    Derm Problem     Pt spot of concern on nasal bridge.        History of Present Illness:  Mr. Joann Burgess is a 38 year old male who presents as a new patient.    Patient presents for concerns about a spot on his nose that is been present for years.  It will sometimes get larger, he tied a string around and then it falls off and will come back.  Today there is about a 1 mm papule on the nose.  Its not bleeding or growing rapidly not painful.  He mainly wants to know whether this area is a danger to his head    Labs:      Physical exam:  Vitals: There were no vitals taken for this visit.  GEN: well developed, well-nourished, in no acute distress, in a pleasant mood.     SKIN: Virgen phototype 3  - Focused examination of the face was performed.  - skin colored to whitish papule on the nose 1mm   - No other lesions of concern on  areas examined.     Past Medical History:   History reviewed. No pertinent past medical history.  History reviewed. No pertinent surgical history.    Social History:   reports that he has never smoked. He has never used smokeless tobacco. He reports that he does not drink alcohol and does not use drugs.    Family History:  History reviewed. No pertinent family history.    Medications:  Current Outpatient Medications   Medication Sig Dispense Refill    blood glucose (NO BRAND SPECIFIED) test strip Use to test blood sugar 2 times daily or as directed. To accompany: Blood Glucose Monitor Brands: per insurance. 100 strip 6    blood glucose monitoring (NO BRAND SPECIFIED) meter device kit Use to test blood sugar 2 times daily or as directed. Preferred blood glucose meter OR supplies to accompany: Blood Glucose Monitor Brands: per insurance. 1 kit 0    naproxen (NAPROSYN) 500 MG tablet Take 1 tablet (500 mg) by mouth 2 times daily (with meals) 60 tablet 4    thin (NO BRAND SPECIFIED) lancets Use with lanceting device. To accompany: Blood Glucose Monitor Brands: per insurance. 100 each 6     No Known Allergies

## 2023-10-31 NOTE — NURSING NOTE
Dermatology Rooming Note    Joann Burgess's goals for this visit include:   Chief Complaint   Patient presents with    Derm Problem     Pt spot of concern on nasal bridge.      Keven Marcial, EMT-B

## 2023-12-08 DIAGNOSIS — E11.9 TYPE 2 DIABETES MELLITUS WITHOUT COMPLICATION, WITHOUT LONG-TERM CURRENT USE OF INSULIN (H): ICD-10-CM

## 2023-12-08 RX ORDER — LANCETS
EACH MISCELLANEOUS
Qty: 100 EACH | Refills: 6 | Status: SHIPPED | OUTPATIENT
Start: 2023-12-08

## 2023-12-08 NOTE — TELEPHONE ENCOUNTER
Medication Question or Refill        What medication are you calling about (include dose and sig)?: blood glucose monitoring (NO BRAND SPECIFIED) meter device kit     thin (NO BRAND SPECIFIED) lancets     blood glucose (NO BRAND SPECIFIED) test strip       Preferred Pharmacy:    [id number] - [place], [place] - [address]  [address] [place] [address]  Phone: [phone] Fax: [phone]      Controlled Substance Agreement on file:   CSA -- Patient Level:    CSA: None found at the patient level.       Who prescribed the medication?:     Do you need a refill? Yes    When did you use the medication last? N/A

## 2023-12-31 ENCOUNTER — HEALTH MAINTENANCE LETTER (OUTPATIENT)
Age: 38
End: 2023-12-31

## 2024-01-26 ENCOUNTER — OFFICE VISIT (OUTPATIENT)
Dept: FAMILY MEDICINE | Facility: CLINIC | Age: 39
End: 2024-01-26
Payer: COMMERCIAL

## 2024-01-26 VITALS
HEART RATE: 117 BPM | BODY MASS INDEX: 25.98 KG/M2 | TEMPERATURE: 98.5 F | OXYGEN SATURATION: 96 % | DIASTOLIC BLOOD PRESSURE: 81 MMHG | WEIGHT: 172.6 LBS | SYSTOLIC BLOOD PRESSURE: 133 MMHG | RESPIRATION RATE: 16 BRPM

## 2024-01-26 DIAGNOSIS — R07.0 THROAT PAIN: ICD-10-CM

## 2024-01-26 DIAGNOSIS — B34.9 VIRAL ILLNESS: Primary | ICD-10-CM

## 2024-01-26 LAB
DEPRECATED S PYO AG THROAT QL EIA: NEGATIVE
GROUP A STREP BY PCR: NOT DETECTED

## 2024-01-26 PROCEDURE — 87651 STREP A DNA AMP PROBE: CPT | Performed by: PHYSICIAN ASSISTANT

## 2024-01-26 PROCEDURE — 99213 OFFICE O/P EST LOW 20 MIN: CPT | Performed by: PHYSICIAN ASSISTANT

## 2024-01-26 RX ORDER — ALBUTEROL SULFATE 90 UG/1
2 AEROSOL, METERED RESPIRATORY (INHALATION) EVERY 6 HOURS
Qty: 18 G | Refills: 0 | Status: SHIPPED | OUTPATIENT
Start: 2024-01-26 | End: 2024-02-25

## 2024-01-26 RX ORDER — BENZONATATE 100 MG/1
100 CAPSULE ORAL 3 TIMES DAILY PRN
Qty: 30 CAPSULE | Refills: 0 | Status: SHIPPED | OUTPATIENT
Start: 2024-01-26 | End: 2024-02-05

## 2024-01-26 NOTE — PROGRESS NOTES
Patient presents with:  Fever: Has had a fever since this afternoon took temp and it was 100.2 and his throat has been sore for 2 weeks and this afternoon he has been dizzy       Clinical Decision Making:  Strep test was obtained and was negative.  Culture is to follow.  Symptomatic care was gone over.  Use of the albuterol inhaler and Tessalon Perles for cough for symptomatic care. Expected course of resolution and indication for return was gone over and questions were answered to patient/parent's satisfaction before discharge.        ICD-10-CM    1. Viral illness  B34.9 albuterol (PROAIR HFA/PROVENTIL HFA/VENTOLIN HFA) 108 (90 Base) MCG/ACT inhaler     benzonatate (TESSALON) 100 MG capsule      2. Throat pain  R07.0 Streptococcus A Rapid Screen w/Reflex to PCR - Clinic Collect     Group A Streptococcus PCR Throat Swab          Patient Instructions   You were seen today for viral illness.    Symptom management:  - Get plenty of rest  - Avoid smoking and second hand smoke  - May take tylenol or ibuprofen for fever/discomfort  - Drink plenty of non-caffeinated fluids  - Use nasal steroid spray for sinus congestion  - Albuterol inhaler may be used every 6 hours as needed for chest tightness      Reasons to be seen in the emergency room:  - Develop a fever of 100.4 or higher  - Cough changes, coughing up blood, or become short of breath  - Neck stiffness  - Chest pain  - Severe headache  - Unable to tolerate eating or drinking fluids    Otherwise, if no symptom improvement after 5 days, follow-up with your primary care provider.    Suggested increased rest increased fluids and bedside humidification  Over-the-counter Tylenol for comfort.  Follow packaging directions  Over-the-counter throat lozenges with benzocaine, such as Cepacol, may be used if indicated and is not a choking hazard based on age.    Follow packaging directions for throat lozenges.    Do not overuse the benzocaine as it will dry the throat and make it  uncomfortable.  Use one lozenge per hour as directed on package and may use hard candies lemon drops etc. keep the saliva flowing until the next dosing interval after 1 hour.    Follow up with primary care provider if you do not get resolution with the course of treatment.  Return to walk-in care if complication or new symptoms arise in the interim.      HPI:  Joann Burgess is a 38 year old male who presents today for a two week history of sore throat and odynophagia, itchy dry throat with dry nonproductive cough. Patient denies fever, chills, night sweats, fatigue, vomiting, diarrhea, skin rash, abdominal pain or urinary symptoms.      No known sick contacts for strep throat.    Has not tried treatment for this over-the-counter.  Patient has used over-the-counter avery and turmeric for the sore throat with mild relief.    History obtained from chart review and the patient.    Problem List:  2023-09: Hypertriglyceridemia  2023-09: Diabetes mellitus, type 2 (H)  2023-06: Weakness of trunk musculature      No past medical history on file.    Social History     Tobacco Use    Smoking status: Never    Smokeless tobacco: Never   Substance Use Topics    Alcohol use: Never       Review of Systems  As above in HPI otherwise negative.    Vitals:    01/26/24 1721   BP: 133/81   Pulse: 117   Resp: 16   Temp: 98.5  F (36.9  C)   TempSrc: Oral   SpO2: 96%   Weight: 78.3 kg (172 lb 9.6 oz)       General: Patient is resting comfortably no acute distress is afebrile  HEENT: Head is normocephalic atraumatic   eyes are PERRL EOMI sclera anicteric   TMs are clear bilaterally  Throat is without pharyngeal wall erythema and no exudate  No cervical lymphadenopathy present  LUNGS: Clear to auscultation bilaterally  HEART: Regular rate and rhythm  Skin: Without rash non-diaphoretic    Physical Exam      Labs:  Results for orders placed or performed in visit on 01/26/24   Streptococcus A Rapid Screen w/Reflex to PCR - Clinic Collect      Status: Normal    Specimen: Throat; Swab   Result Value Ref Range    Group A Strep antigen Negative Negative       At the end of the encounter, I discussed results, diagnosis, medications. Discussed red flags for immediate return to clinic/ER, as well as indications for follow up if no improvement. Patient understood and agreed to plan. Patient was stable for discharge.

## 2024-01-27 NOTE — PATIENT INSTRUCTIONS
You were seen today for viral illness.    Symptom management:  - Get plenty of rest  - Avoid smoking and second hand smoke  - May take tylenol or ibuprofen for fever/discomfort  - Drink plenty of non-caffeinated fluids  - Use nasal steroid spray for sinus congestion  - Albuterol inhaler may be used every 6 hours as needed for chest tightness      Reasons to be seen in the emergency room:  - Develop a fever of 100.4 or higher  - Cough changes, coughing up blood, or become short of breath  - Neck stiffness  - Chest pain  - Severe headache  - Unable to tolerate eating or drinking fluids    Otherwise, if no symptom improvement after 5 days, follow-up with your primary care provider.    Suggested increased rest increased fluids and bedside humidification  Over-the-counter Tylenol for comfort.  Follow packaging directions  Over-the-counter throat lozenges with benzocaine, such as Cepacol, may be used if indicated and is not a choking hazard based on age.    Follow packaging directions for throat lozenges.    Do not overuse the benzocaine as it will dry the throat and make it uncomfortable.  Use one lozenge per hour as directed on package and may use hard candies lemon drops etc. keep the saliva flowing until the next dosing interval after 1 hour.    Follow up with primary care provider if you do not get resolution with the course of treatment.  Return to walk-in care if complication or new symptoms arise in the interim.

## 2024-03-26 ENCOUNTER — OFFICE VISIT (OUTPATIENT)
Dept: ALLERGY | Facility: CLINIC | Age: 39
End: 2024-03-26
Payer: COMMERCIAL

## 2024-03-26 ENCOUNTER — PRE VISIT (OUTPATIENT)
Dept: ALLERGY | Facility: CLINIC | Age: 39
End: 2024-03-26

## 2024-03-26 DIAGNOSIS — T78.1XXD POLLEN-FOOD ALLERGY, SUBSEQUENT ENCOUNTER: Primary | ICD-10-CM

## 2024-03-26 DIAGNOSIS — H10.10 SEASONAL AND PERENNIAL ALLERGIC RHINOCONJUNCTIVITIS: ICD-10-CM

## 2024-03-26 DIAGNOSIS — Z88.9 ATOPY: ICD-10-CM

## 2024-03-26 DIAGNOSIS — J30.89 SEASONAL AND PERENNIAL ALLERGIC RHINOCONJUNCTIVITIS: ICD-10-CM

## 2024-03-26 DIAGNOSIS — J30.2 SEASONAL AND PERENNIAL ALLERGIC RHINOCONJUNCTIVITIS: ICD-10-CM

## 2024-03-26 PROCEDURE — 95004 PERQ TESTS W/ALRGNC XTRCS: CPT | Performed by: DERMATOLOGY

## 2024-03-26 PROCEDURE — 99214 OFFICE O/P EST MOD 30 MIN: CPT | Mod: 25 | Performed by: DERMATOLOGY

## 2024-03-26 RX ORDER — LORATADINE 10 MG/1
10 TABLET ORAL DAILY PRN
Qty: 60 TABLET | Refills: 5 | Status: SHIPPED | OUTPATIENT
Start: 2024-03-26

## 2024-03-26 RX ORDER — OLOPATADINE HYDROCHLORIDE 2 MG/ML
1 SOLUTION/ DROPS OPHTHALMIC DAILY
Qty: 2.5 ML | Refills: 5 | Status: SHIPPED | OUTPATIENT
Start: 2024-03-26

## 2024-03-26 RX ORDER — AZELASTINE 1 MG/ML
1 SPRAY, METERED NASAL 2 TIMES DAILY PRN
Qty: 30 ML | Refills: 5 | Status: SHIPPED | OUTPATIENT
Start: 2024-03-26

## 2024-03-26 NOTE — PROGRESS NOTES
[facility] Dermato-allergology Note  Office visit  Encounter Date: Mar 26, 2024  ____________________________________________    CC: Allergy Consult (January 2023 rash in mouth, pineapple/jackfruit/cherry/apple suspected cause after 15-20 minutes, has happened 7-8 times since. Suspect allergy to pollen causing itchy eyes noticed around 2018, antihistamines help, also suspects dog allergy. )      HPI:  (Mar 26, 2024)  Mr. Joann Burgess is a(n) 38 year old male who presents today as new patient for allergy consultation  - He has itchy eyes and stuffy nose that started around 2018, but no sinus issues or PND  - More in spring, maybe once an issue this past fall  - No H/O asthma  - When he eats chili, pineapple, jackfruit, cherries, kiwis, and sometimes apples, he will get itching in his mouth x15-20 minutes; started 1/2023 and has happened 7-8x since  - No H/O eczema or dry scale  - Antihistamines (Zyrtec) help with seasonal allergy symptoms; has not tried Claritin  - Usually does not take every day, only when he has symptoms  - Stopped taking 5 days ago  - He also suspects he has a dog allergy  - Otherwise feeling well in usual state of health    Physical Exam:  General: In no acute distress, well-developed, well-nourished  Eyes: no conjunctivitis  ENT: no signs of rhinitis   Pulmonary: no wheezing or coughing  Skin: Focused examination of the skin on test sites was performed = see test results below    Past Medical History:   Patient Active Problem List   Diagnosis    Diabetes mellitus, type 2 (H)    Hypertriglyceridemia     No past medical history on file.    Allergies:  No Known Allergies    Medications:  Current Outpatient Medications   Medication    albuterol (PROAIR HFA/PROVENTIL HFA/VENTOLIN HFA) 108 (90 Base) MCG/ACT inhaler    blood glucose (NO BRAND SPECIFIED) test strip    blood glucose monitoring (NO BRAND SPECIFIED) meter device kit    naproxen (NAPROSYN) 500 MG tablet    thin (NO  BRAND SPECIFIED) lancets     No current facility-administered medications for this visit.       Social History:  The patient works in a retail store. Patient has the following hobbies or non-occupational exposure: none.    Family History:  No family history on file.    Previous Labs, Allergy Tests, Dermatopathology, [facility] Dr. Chris Prabhakar ()  From :  Follow Up and Consult For (Hernia and allergy. Would like to have referral for allergy testing.)     From [facility]:  He does mention possible allergy to chili, pineapple with sometimes itchy around his lips area.     Referred By: Referred Self, MD  No address on file     Allergy Tests:  Past Allergy Test - patient confirmed on 3/20/24 no prior allergy testing.    Order for Future Allergy Testing:    [x] Outpatient  [] Inpatient: [facility]..../ Bed ....       Skin Atopy (atopic dermatitis) [] Yes   [x] No .........  Contact allergies:   [] Yes   [x] No ..........  Hand eczema:   [] Yes   [x] No           Leading hand:   [] R   [] L       [] Ambidextrous         Drug allergies:        [] Yes   [x] No  which?......    Urticaria/Angioedema  [] Yes   [x] No .........  Food Allergy:  [x] Yes   [] No  which?......see [facility]  Pets :  [x] Yes   [] No  which?......see HPI         [x]  Rhinitis   [x] Conjunctivitis   [] Sinusitis   [] Polyposis   [] Otitis   [] Pharyngitis         []  Postnasal drip    []  none  Operations:   [] Tonsils   [] Septum   [] Sinus   [] Polyposis        [] Asthma bronchiale   [] Coughing      [x]  none  Symptoms (mostly Rhinoconjunctivitis and Asthma) aggravated by:  Season   [] I   [] II   [x] III   [x] IV   []V   []VI   []VII   []VIII   []IX   []X   []XI   []XII     [] perennial   Day time      [] morning   [] noon      [] evening        [] night    [] whole day........  [x]  none  Location/changes    [] inside        [] outside   [] mountains    [] sea     [] others.............   [x]  none  Triggers, specific     [] animals     [] plants     []  dust              [] others ...........................    [x]  none  Triggers, others       [] work          [] psyche    [] sport            [] others .............................  [x]  none  Irritant                [] phys efforts [] smoke    [] heat/cold     [] odors  []others............... [x]  none    Order for PATCH TESTS  Reason for tests (suspected allergy): not necessary at this time  Known previous allergies: N/A  Standardized panels  [] Standard panel (40 tests)  [] Preservatives & Antimicrobials (31 tests)  [] Emulsifiers & Additives (25 tests)   [] Perfumes/Flavours & Plants (25 tests)  [] Hairdresser panel (12 tests)  [] Rubber Chemicals (22 tests)  [] Plastics (26 tests)  [] Colorants/Dyes/Food additives (20 tests)  [] Metals (implants/dental) (24 tests)  [] Local anaesthetics/NSAIDs (13 tests)  [] Antibiotics & Antimycotics (14 tests)   [] Corticosteroids (15 tests)   [] Photopatch test (62 tests)   [] others: ...      [] Patient's own products: ...  DO NOT test if chemical or biological identity is unknown!   always ask from patient the product information and safety sheets (MSDS)       Order for PRICK TESTS    Reason for tests (suspected allergy): RC and itchy mouth when eating certain fruits and chili  Known previous allergies: none    Standardized prick panels  [x] Atopic panel (20 tests)  [] Pediatric Panel (12 tests)  [] Milk, Meat, Eggs, Grains (20 tests)   [] Dust, Epithelia, Feathers (10 tests)  [] Fish, Seafood, Shellfish (17 tests)  [] Nuts, Beans (14 tests)  [] Spice, Vegetable, Fruit (17 tests)  [] Pollen Panel = Tree, Grass, Weed (24 tests)  [] Others: ...      [] Patient's own products: ...  DO NOT test if chemical or biological identity is unknown!   always ask from patient the product information and safety sheets (MSDS)     Standardized intradermal tests  [] Penicillium notatum [] Aspergillus fumigatus [] House dust mites DMichaelfar & ARMANDO pteron  [] Cat    [] dog  [] Others: ...  []  Bee venom   [] Wasp venom  !!Specific protocol with dilutions!!       Order for Drug allergy tests (prick & Intradermal & patch tests)    [] Penicillin G  [] Ampicillin [] Cefazolin   [] Ceftriaxone   [] Ceftazidime  [] Bactrim    [] Others: ...  Order for ... as test date         Atopy Screen (Placed Mar 26, 2024)  No Substance Readings (15 min) Evaluation   POS Histamine 1mg/ml ++    NEG NaCl 0.9% -      No Substance Readings (15 min) Evaluation   1 Alternaria alternata (tenuis)  -    2 Cladosporium herbarum -    3 Aspergillus fumigatus -    4 Penicillium notatum -    5 Dermatophagoides pteronyssinus -    6 Dermatophagoides farinae -    7 Dog epithelium (canis spp) -    8 Cat hair (zoraida catus) ++    9 Cockroach   (Blatella americana & germanica) -    10 Grass mix midwest   (Agnes, Orchard, Redtop, Benitez) -    11 Charly grass (sorghum halepense) -    12 Weed mix   (common Cocklebur, Lamb s quarters, rough redroot Pigweed, Dock/Sorrel) -    13 Mug wort (artemisia vulgare) ++    14 Ragweed giant/short (ambrosia spp) ++    15 White birch (Betula papyrifera) ++    16 Tree mix 1 (Pecan, Maple BHR, Oak RVW, american [place], black [place]) +/++    17 Red cedar (juniperus virginia) +/++    18 Tree mix 2   (white [place], river/red Birch, black [place], common [place], american Elm) ++    19 Box elder/Maple mix (acer spp) -    20 [place]ford shagbark (carya ovata) -    Conclusion:  ________________________________    Assessment & Plan:    ==> Final Diagnosis:     # Atopic predisposition with:  Seasonal RC (spring > fall)  Oral allergy syndrome to various raw fruits (e.g., cherries, apples, kiwi, pineapple, jackfruit) and chili  * acute uncomplicated illness    These conclusions are made at the best of one's knowledge and belief based on the provided evidence such as patient's history and allergy test results and they can change over time or can be incomplete because of missing information.    ==> Treatment Plan:    >> For  oral symptoms and seasonal allergies, can take loratadine 10 mg 1-2 tablets PO QD.  >>>> If aforementioned foods are cooked, allergens are usually gone and he should not have a reaction.    >> For control of seasonal allergies during spring and fall:  >>>> Start azelastine 0.1% nasal spray: Spray 1 spray into both nostrils 2 times daily.  >>>> Start Pataday eye drops: Place 0.05 mLs (1 drop) into both eyes daily.    Procedures Performed: Allergy prick tests    Staff and Scribe:  Provider    Scribe Disclosure:   I, MONCHO VALADEZ, am serving as a scribe to document services personally performed by Jorge L Yung MD based on data collection and the provider's statements to me.     Staff Physician Comments:  I was present with the scribe who participated in the documentation of the note. I have verified the history and personally performed the physical exam and medical decision making. I agree with the assessment and plan as documented in the note. I have reviewed and if necessary amended the note.      Jorge L Yung MD  Professor  Head of Dermato-Allergy Division  Department of Dermatology  [facility]    Follow-up in Derm-Allergy clinic in about 2 months if necessary    I spent a total of 35minutes with Joann Burgess. This time was spent counseling the patient and/or coordinating care, explaining the allergy tests, performing allergy tests and assessing the clinical relevance.

## 2024-03-26 NOTE — PATIENT INSTRUCTIONS
Cross Reactions    This cross-reaction (or pollen-associated food allergy) can be explained by the fact that pollen and food allergens are similar in structure and so are mistaken for each other by the immune system. Sufferers generally have mild symptoms such as itching, redness and mild swelling in the mouth and throat.    What is a cross-reaction?   In this form of food allergy, the respiratory tract (nose, lungs) first becomes sensitised to birch pollen, for example. Because the protein structures of inhalation and food allergens are similar, this results in a cross-reaction, or pollen-associated food allergy. Birch pollen-nut-pip fruit or mugwort-celery spice syndrome are common examples of a cross-reaction. Therefore someone who has become sensitised to birch pollen may also have an allergic reaction to apples and nuts and vice versa. There are other respiratory tract allergens apart from pollen that may cause a cross-reaction to foods, e.g. latex (natural rubber), animal allergens (e.g. cats, birds) and house dust mites.     How can a cross-reaction be identified?   Sufferers from pollen allergy who experience a tingling in the palate, burning and itching in the mouth area and lips or even develop swelling affecting the face when eating certain foods should consider that they may have a cross-reaction.    How can they be prevented?   Anyone who notices tingling/burning in the mouth or throat should no longer consume the food concerned. Admittedly, a lot of proteins are destroyed by cooking and heating and the food can be eaten again. A frequent recommendation is to eat only small amounts of the food in question. Occasionally pollen therapy by means of desensitisation will also relieve the food allergy.    What are the most common cross-reactions?   Around 70 per cent of people who suffer from tree pollen allergies are found to have cross-reactions with foods. Cross-reactions are less common with other types  "of pollen and respiratory tract allergens. The following are typical:    Birch, steven, lizette pollen (January-April) Stone and pip fruit (apples, pears, plums, apricots, cherries, etc.), hazelnut, walnut, almonds, tomatoes, carrots, celery, gagan, avocado, fennel, kiwi fruit, lychee   Mugwort pollen (Artemisia vulgaris) (July-August) Celery, carrots, fennel, artichokes, camomile, pepper, mustard, dill, parsley, coriander, christa, aniseed, sunflower seeds.   House dust mites Prawns, lobster, langoustine, crab, snails   Latex Avocado, bananas, sweet chestnut (vermicelli, roasted), kiwi fruit, papaya, figs, paprika   Bird feathers Chicken eggs (yolk)         Modified from \"Cross-Reactions\" by [employer] [facility] [place].   "

## 2024-03-26 NOTE — NURSING NOTE
Chief Complaint   Patient presents with    Allergy Consult     January 2023 rash in mouth, pineapple/jackfruit/cherry/apple suspected cause after 15-20 minutes, has happened 7-8 times since. Suspect allergy to pollen causing itchy eyes noticed around 2018, antihistamines help, also suspects dog allergy.      Anival Mallory RN

## 2024-03-26 NOTE — LETTER
3/26/2024         RE: Joann Burgess  [address]  [place] [address]        Dear Colleague,    Thank you for referring your patient, Joann Burgess, to the [facility] [place]. Please see a copy of my visit note below.    [facility] Dermato-allergology Note  Office visit  Encounter Date: Mar 26, 2024  ____________________________________________    [facility]: Allergy Consult (January 2023 rash in mouth, pineapple/jackfruit/cherry/apple suspected cause after 15-20 minutes, has happened 7-8 times since. Suspect allergy to pollen causing itchy eyes noticed around 2018, antihistamines help, also suspects dog allergy. )      HPI:  (Mar 26, 2024)  Mr. Joann Burgess is a(n) 38 year old male who presents today as new patient for allergy consultation  - He has itchy eyes and stuffy nose that started around 2018, but no sinus issues or PND  - More in spring, maybe once an issue this past fall  - No H/O asthma  - When he eats chili, pineapple, jackfruit, cherries, kiwis, and sometimes apples, he will get itching in his mouth x15-20 minutes; started 1/2023 and has happened 7-8x since  - No H/O eczema or dry scale  - Antihistamines (Zyrtec) help with seasonal allergy symptoms; has not tried Claritin  - Usually does not take every day, only when he has symptoms  - Stopped taking 5 days ago  - He also suspects he has a dog allergy  - Otherwise feeling well in usual state of health    Physical Exam:  General: In no acute distress, well-developed, well-nourished  Eyes: no conjunctivitis  ENT: no signs of rhinitis   Pulmonary: no wheezing or coughing  Skin: Focused examination of the skin on test sites was performed = see test results below    Past Medical History:   Patient Active Problem List   Diagnosis     Diabetes mellitus, type 2 (H)     Hypertriglyceridemia     No past medical history on file.    Allergies:  No Known Allergies    Medications:  Current Outpatient  Medications   Medication     albuterol (PROAIR HFA/PROVENTIL HFA/VENTOLIN HFA) 108 (90 Base) MCG/ACT inhaler     blood glucose (NO BRAND SPECIFIED) test strip     blood glucose monitoring (NO BRAND SPECIFIED) meter device kit     naproxen (NAPROSYN) 500 MG tablet     thin (NO BRAND SPECIFIED) lancets     No current facility-administered medications for this visit.       Social History:  The patient works in a retail store. Patient has the following hobbies or non-occupational exposure: none.    Family History:  No family history on file.    Previous Labs, Allergy Tests, Dermatopathology, Imagin[facility] Dr. Chris Prabhakar ()  From [facility]:  Follow Up and Consult For (Hernia and allergy. Would like to have referral for allergy testing.)     From [facility]:  He does mention possible allergy to chili, pineapple with sometimes itchy around his lips area.     Referred By: Referred Self, MD  No address on file     Allergy Tests:  Past Allergy Test - patient confirmed on 3/20/24 no prior allergy testing.    Order for Future Allergy Testing:    [x] Outpatient  [] Inpatient: [facility]..../ Bed ....       Skin Atopy (atopic dermatitis) [] Yes   [x] No .........  Contact allergies:   [] Yes   [x] No ..........  Hand eczema:   [] Yes   [x] No           Leading hand:   [] R   [] L       [] Ambidextrous         Drug allergies:        [] Yes   [x] No  which?......    Urticaria/Angioedema  [] Yes   [x] No .........  Food Allergy:  [x] Yes   [] No  which?......see [facility]  Pets :  [x] Yes   [] No  which?......see HPI         [x]  Rhinitis   [x] Conjunctivitis   [] Sinusitis   [] Polyposis   [] Otitis   [] Pharyngitis         []  Postnasal drip    []  none  Operations:   [] Tonsils   [] Septum   [] Sinus   [] Polyposis        [] Asthma bronchiale   [] Coughing      [x]  none  Symptoms (mostly Rhinoconjunctivitis and Asthma) aggravated by:  Season   [] I   [] II   [x] III   [x] IV   []V   []VI   []VII   []VIII   []IX   []X   []XI   []XII     []  perennial   Day time      [] morning   [] noon      [] evening        [] night    [] whole day........  [x]  none  Location/changes    [] inside        [] outside   [] mountains    [] sea     [] others.............   [x]  none  Triggers, specific     [] animals     [] plants     [] dust              [] others ...........................    [x]  none  Triggers, others       [] work          [] psyche    [] sport            [] others .............................  [x]  none  Irritant                [] phys efforts [] smoke    [] heat/cold     [] odors  []others............... [x]  none    Order for PATCH TESTS  Reason for tests (suspected allergy): not necessary at this time  Known previous allergies: N/A  Standardized panels  [] Standard panel (40 tests)  [] Preservatives & Antimicrobials (31 tests)  [] Emulsifiers & Additives (25 tests)   [] Perfumes/Flavours & Plants (25 tests)  [] Hairdresser panel (12 tests)  [] Rubber Chemicals (22 tests)  [] Plastics (26 tests)  [] Colorants/Dyes/Food additives (20 tests)  [] Metals (implants/dental) (24 tests)  [] Local anaesthetics/NSAIDs (13 tests)  [] Antibiotics & Antimycotics (14 tests)   [] Corticosteroids (15 tests)   [] Photopatch test (62 tests)   [] others: ...      [] Patient's own products: ...  DO NOT test if chemical or biological identity is unknown!   always ask from patient the product information and safety sheets (MSDS)       Order for PRICK TESTS    Reason for tests (suspected allergy): RC and itchy mouth when eating certain fruits and chili  Known previous allergies: none    Standardized prick panels  [x] Atopic panel (20 tests)  [] Pediatric Panel (12 tests)  [] Milk, Meat, Eggs, Grains (20 tests)   [] Dust, Epithelia, Feathers (10 tests)  [] Fish, Seafood, Shellfish (17 tests)  [] Nuts, Beans (14 tests)  [] Spice, Vegetable, Fruit (17 tests)  [] Pollen Panel = Tree, Grass, Weed (24 tests)  [] Others: ...      [] Patient's own products: ...  DO NOT test  if chemical or biological identity is unknown!   always ask from patient the product information and safety sheets (MSDS)     Standardized intradermal tests  [] Penicillium notatum [] Aspergillus fumigatus [] House dust mites D.far & D. pteron  [] Cat    [] dog  [] Others: ...  [] Bee venom   [] Wasp venom  !!Specific protocol with dilutions!!       Order for Drug allergy tests (prick & Intradermal & patch tests)    [] Penicillin G  [] Ampicillin [] Cefazolin   [] Ceftriaxone   [] Ceftazidime  [] Bactrim    [] Others: ...  Order for ... as test date         Atopy Screen (Placed Mar 26, 2024)  No Substance Readings (15 min) Evaluation   POS Histamine 1mg/ml ++    NEG NaCl 0.9% -      No Substance Readings (15 min) Evaluation   1 Alternaria alternata (tenuis)  -    2 Cladosporium herbarum -    3 Aspergillus fumigatus -    4 Penicillium notatum -    5 Dermatophagoides pteronyssinus -    6 Dermatophagoides farinae -    7 Dog epithelium (canis spp) -    8 Cat hair (zoraida catus) ++    9 Cockroach   (Blatella americana & germanica) -    10 Grass mix midwest   (Agnes, Orchard, Redtop, Benitez) -    11 Charly grass (sorghum halepense) -    12 Weed mix   (common Cocklebur, Lamb s quarters, rough redroot Pigweed, Dock/Sorrel) -    13 Mug wort (artemisia vulgare) ++    14 Ragweed giant/short (ambrosia spp) ++    15 White birch (Betula papyrifera) ++    16 Tree mix 1 (Pecan, Maple BHR, Oak RVW, american [place], black [place]) +/++    17 Red cedar (juniperus virginia) +/++    18 Tree mix 2   (white [place], river/red Birch, black [place], common [place], american Elm) ++    19 Box elder/Maple mix (acer spp) -    20 [place] shagbark (carya ovata) -    Conclusion:  ________________________________    Assessment & Plan:    ==> Final Diagnosis:     # Atopic predisposition with:  Seasonal RC (spring > fall)  Oral allergy syndrome to various raw fruits (e.g., cherries, apples, kiwi, pineapple, jackfruit) and chili  * acute uncomplicated  illness    These conclusions are made at the best of one's knowledge and belief based on the provided evidence such as patient's history and allergy test results and they can change over time or can be incomplete because of missing information.    ==> Treatment Plan:    >> For oral symptoms and seasonal allergies, can take loratadine 10 mg 1-2 tablets PO QD.  >>>> If aforementioned foods are cooked, allergens are usually gone and he should not have a reaction.    >> For control of seasonal allergies during spring and fall:  >>>> Start azelastine 0.1% nasal spray: Spray 1 spray into both nostrils 2 times daily.  >>>> Start Pataday eye drops: Place 0.05 mLs (1 drop) into both eyes daily.    Procedures Performed: Allergy prick tests    Staff and Scribe:  Provider    Scribe Disclosure:   I, MONCHO VALADEZ, am serving as a scribe to document services personally performed by Jorge L Yung MD based on data collection and the provider's statements to me.     Staff Physician Comments:  I was present with the scribe who participated in the documentation of the note. I have verified the history and personally performed the physical exam and medical decision making. I agree with the assessment and plan as documented in the note. I have reviewed and if necessary amended the note.      Jorge L Yung MD  Professor  Head of Dermato-Allergy Division  Department of Dermatology  [facility]    Follow-up in Derm-Allergy clinic in about 2 months if necessary    I spent a total of 35minutes with Joann Burgess. This time was spent counseling the patient and/or coordinating care, explaining the allergy tests, performing allergy tests and assessing the clinical relevance.      Again, thank you for allowing me to participate in the care of your patient.        Sincerely,        Jorge L Yung MD

## 2024-05-19 ENCOUNTER — HEALTH MAINTENANCE LETTER (OUTPATIENT)
Age: 39
End: 2024-05-19

## 2024-07-28 ENCOUNTER — HEALTH MAINTENANCE LETTER (OUTPATIENT)
Age: 39
End: 2024-07-28

## 2024-10-06 ENCOUNTER — HEALTH MAINTENANCE LETTER (OUTPATIENT)
Age: 39
End: 2024-10-06

## 2025-01-19 ENCOUNTER — HEALTH MAINTENANCE LETTER (OUTPATIENT)
Age: 40
End: 2025-01-19

## 2025-01-22 ENCOUNTER — MYC REFILL (OUTPATIENT)
Dept: FAMILY MEDICINE | Facility: CLINIC | Age: 40
End: 2025-01-22

## 2025-01-22 DIAGNOSIS — E11.9 TYPE 2 DIABETES MELLITUS WITHOUT COMPLICATION, WITHOUT LONG-TERM CURRENT USE OF INSULIN (H): ICD-10-CM

## 2025-04-27 ENCOUNTER — HEALTH MAINTENANCE LETTER (OUTPATIENT)
Age: 40
End: 2025-04-27

## 2025-08-10 ENCOUNTER — HEALTH MAINTENANCE LETTER (OUTPATIENT)
Age: 40
End: 2025-08-10